# Patient Record
Sex: MALE | Race: WHITE | NOT HISPANIC OR LATINO | Employment: FULL TIME | URBAN - METROPOLITAN AREA
[De-identification: names, ages, dates, MRNs, and addresses within clinical notes are randomized per-mention and may not be internally consistent; named-entity substitution may affect disease eponyms.]

---

## 2024-05-24 ENCOUNTER — LAB (OUTPATIENT)
Dept: LAB | Facility: LAB | Age: 29
End: 2024-05-24
Payer: MEDICAID

## 2024-05-24 ENCOUNTER — OFFICE VISIT (OUTPATIENT)
Dept: PRIMARY CARE | Facility: CLINIC | Age: 29
End: 2024-05-24
Payer: MEDICAID

## 2024-05-24 VITALS
HEIGHT: 72 IN | BODY MASS INDEX: 21.13 KG/M2 | WEIGHT: 156 LBS | DIASTOLIC BLOOD PRESSURE: 66 MMHG | HEART RATE: 66 BPM | TEMPERATURE: 97.1 F | OXYGEN SATURATION: 96 % | SYSTOLIC BLOOD PRESSURE: 118 MMHG

## 2024-05-24 DIAGNOSIS — R11.2 NAUSEA AND VOMITING, UNSPECIFIED VOMITING TYPE: ICD-10-CM

## 2024-05-24 DIAGNOSIS — F20.9 SCHIZOPHRENIA, UNSPECIFIED TYPE (MULTI): ICD-10-CM

## 2024-05-24 DIAGNOSIS — K29.50 CHRONIC GASTRITIS WITHOUT BLEEDING, UNSPECIFIED GASTRITIS TYPE: ICD-10-CM

## 2024-05-24 DIAGNOSIS — Z00.00 WELL ADULT EXAM: Primary | ICD-10-CM

## 2024-05-24 DIAGNOSIS — Z23 ENCOUNTER FOR IMMUNIZATION: ICD-10-CM

## 2024-05-24 DIAGNOSIS — M54.42 CHRONIC LEFT-SIDED LOW BACK PAIN WITH LEFT-SIDED SCIATICA: ICD-10-CM

## 2024-05-24 DIAGNOSIS — G89.29 CHRONIC LEFT-SIDED LOW BACK PAIN WITH LEFT-SIDED SCIATICA: ICD-10-CM

## 2024-05-24 DIAGNOSIS — F41.8 DEPRESSION WITH ANXIETY: ICD-10-CM

## 2024-05-24 DIAGNOSIS — Z00.00 WELL ADULT EXAM: ICD-10-CM

## 2024-05-24 LAB
ALBUMIN SERPL BCP-MCNC: 4.9 G/DL (ref 3.4–5)
ALP SERPL-CCNC: 58 U/L (ref 33–120)
ALT SERPL W P-5'-P-CCNC: 14 U/L (ref 10–52)
ANION GAP SERPL CALC-SCNC: 14 MMOL/L (ref 10–20)
AST SERPL W P-5'-P-CCNC: 15 U/L (ref 9–39)
BILIRUB SERPL-MCNC: 0.4 MG/DL (ref 0–1.2)
BUN SERPL-MCNC: 16 MG/DL (ref 6–23)
CALCIUM SERPL-MCNC: 9.8 MG/DL (ref 8.6–10.6)
CHLORIDE SERPL-SCNC: 101 MMOL/L (ref 98–107)
CHOLEST SERPL-MCNC: 158 MG/DL (ref 0–199)
CHOLESTEROL/HDL RATIO: 2.6
CO2 SERPL-SCNC: 27 MMOL/L (ref 21–32)
CREAT SERPL-MCNC: 1.03 MG/DL (ref 0.5–1.3)
EGFRCR SERPLBLD CKD-EPI 2021: >90 ML/MIN/1.73M*2
ERYTHROCYTE [DISTWIDTH] IN BLOOD BY AUTOMATED COUNT: 12.4 % (ref 11.5–14.5)
GLUCOSE SERPL-MCNC: 96 MG/DL (ref 74–99)
HCT VFR BLD AUTO: 43.6 % (ref 41–52)
HDLC SERPL-MCNC: 59.8 MG/DL
HGB BLD-MCNC: 14.4 G/DL (ref 13.5–17.5)
LDLC SERPL CALC-MCNC: 88 MG/DL
MCH RBC QN AUTO: 29.9 PG (ref 26–34)
MCHC RBC AUTO-ENTMCNC: 33 G/DL (ref 32–36)
MCV RBC AUTO: 91 FL (ref 80–100)
NON HDL CHOLESTEROL: 98 MG/DL (ref 0–149)
NRBC BLD-RTO: 0 /100 WBCS (ref 0–0)
PLATELET # BLD AUTO: 235 X10*3/UL (ref 150–450)
POTASSIUM SERPL-SCNC: 4.6 MMOL/L (ref 3.5–5.3)
PROT SERPL-MCNC: 8 G/DL (ref 6.4–8.2)
RBC # BLD AUTO: 4.82 X10*6/UL (ref 4.5–5.9)
SODIUM SERPL-SCNC: 137 MMOL/L (ref 136–145)
TRIGL SERPL-MCNC: 49 MG/DL (ref 0–149)
TSH SERPL-ACNC: 0.55 MIU/L (ref 0.44–3.98)
VLDL: 10 MG/DL (ref 0–40)
WBC # BLD AUTO: 7.2 X10*3/UL (ref 4.4–11.3)

## 2024-05-24 PROCEDURE — 90471 IMMUNIZATION ADMIN: CPT | Performed by: NURSE PRACTITIONER

## 2024-05-24 PROCEDURE — 85027 COMPLETE CBC AUTOMATED: CPT

## 2024-05-24 PROCEDURE — 1036F TOBACCO NON-USER: CPT | Performed by: NURSE PRACTITIONER

## 2024-05-24 PROCEDURE — 84443 ASSAY THYROID STIM HORMONE: CPT

## 2024-05-24 PROCEDURE — 80061 LIPID PANEL: CPT

## 2024-05-24 PROCEDURE — 36415 COLL VENOUS BLD VENIPUNCTURE: CPT

## 2024-05-24 PROCEDURE — 80053 COMPREHEN METABOLIC PANEL: CPT

## 2024-05-24 PROCEDURE — 90715 TDAP VACCINE 7 YRS/> IM: CPT | Performed by: NURSE PRACTITIONER

## 2024-05-24 PROCEDURE — 99385 PREV VISIT NEW AGE 18-39: CPT | Performed by: NURSE PRACTITIONER

## 2024-05-24 RX ORDER — IBUPROFEN 200 MG
200 TABLET ORAL EVERY 4 HOURS PRN
COMMUNITY

## 2024-05-24 RX ORDER — PALIPERIDONE PALMITATE 234 MG/1.5ML
234 INJECTION INTRAMUSCULAR
COMMUNITY

## 2024-05-24 RX ORDER — OMEPRAZOLE 40 MG/1
40 CAPSULE, DELAYED RELEASE ORAL
Qty: 30 CAPSULE | Refills: 11 | Status: SHIPPED | OUTPATIENT
Start: 2024-05-24 | End: 2025-05-24

## 2024-05-24 RX ORDER — ACETAMINOPHEN 325 MG/1
650 TABLET ORAL EVERY 6 HOURS PRN
COMMUNITY

## 2024-05-24 ASSESSMENT — ENCOUNTER SYMPTOMS
ABDOMINAL DISTENTION: 0
PALPITATIONS: 0
ARTHRALGIAS: 0
DYSURIA: 0
APPETITE CHANGE: 0
NERVOUS/ANXIOUS: 0
EYE ITCHING: 0
NUMBNESS: 0
FREQUENCY: 0
CONSTIPATION: 0
COUGH: 0
NAUSEA: 1
WHEEZING: 0
ABDOMINAL PAIN: 1
DIARRHEA: 0
WOUND: 0
BLOOD IN STOOL: 0
VOMITING: 1
HEMATURIA: 0
SHORTNESS OF BREATH: 0
EYE PAIN: 0
ACTIVITY CHANGE: 0
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
## 2024-05-26 PROBLEM — F41.8 DEPRESSION WITH ANXIETY: Status: ACTIVE | Noted: 2024-05-26

## 2024-05-26 PROBLEM — K29.50 CHRONIC GASTRITIS WITHOUT BLEEDING: Status: ACTIVE | Noted: 2024-05-26

## 2024-05-26 PROBLEM — G89.29 CHRONIC LEFT-SIDED LOW BACK PAIN WITH LEFT-SIDED SCIATICA: Status: ACTIVE | Noted: 2024-05-26

## 2024-05-26 PROBLEM — R11.2 NAUSEA WITH VOMITING: Status: ACTIVE | Noted: 2024-05-26

## 2024-05-26 PROBLEM — F20.9 SCHIZOPHRENIA (MULTI): Status: ACTIVE | Noted: 2024-05-26

## 2024-05-26 PROBLEM — R10.10 RECURRENT UPPER ABDOMINAL PAIN: Status: ACTIVE | Noted: 2024-05-26

## 2024-05-26 PROBLEM — M54.42 CHRONIC LEFT-SIDED LOW BACK PAIN WITH LEFT-SIDED SCIATICA: Status: ACTIVE | Noted: 2024-05-26

## 2024-05-26 NOTE — ASSESSMENT & PLAN NOTE
This is chronic.   Michael is taking prn Pepto/antacids which does provide some relief. In past had episode of hematemesis in 2019.  At that time a CT abd was unremarkable.  EGD completed on 4/2/2019 - normal esophagus, patchy mildly erythmatous gastric antrum mucosa, Biopsied (reactive gastropathy) and hpylori tested (negative).  -start PPI daily -- if symptoms persist will refer to GI in 3 mo   -advised to stop taking ibuprofen (was taking daily for back pain )
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
## 2024-08-02 ENCOUNTER — APPOINTMENT (OUTPATIENT)
Dept: PRIMARY CARE | Facility: CLINIC | Age: 29
End: 2024-08-02
Payer: MEDICAID

## 2024-08-02 VITALS
BODY MASS INDEX: 23.56 KG/M2 | WEIGHT: 173.7 LBS | TEMPERATURE: 97.4 F | OXYGEN SATURATION: 97 % | SYSTOLIC BLOOD PRESSURE: 114 MMHG | DIASTOLIC BLOOD PRESSURE: 77 MMHG | HEART RATE: 90 BPM

## 2024-08-02 DIAGNOSIS — K29.50 CHRONIC GASTRITIS WITHOUT BLEEDING, UNSPECIFIED GASTRITIS TYPE: Primary | ICD-10-CM

## 2024-08-02 PROCEDURE — 1036F TOBACCO NON-USER: CPT | Performed by: NURSE PRACTITIONER

## 2024-08-02 PROCEDURE — 99213 OFFICE O/P EST LOW 20 MIN: CPT | Performed by: NURSE PRACTITIONER

## 2024-08-02 RX ORDER — BUPROPION HYDROCHLORIDE 150 MG/1
150 TABLET ORAL EVERY MORNING
COMMUNITY
Start: 2024-07-24

## 2024-08-02 ASSESSMENT — ENCOUNTER SYMPTOMS
ABDOMINAL DISTENTION: 0
WOUND: 0
ARTHRALGIAS: 0
APPETITE CHANGE: 0
SHORTNESS OF BREATH: 0
WHEEZING: 0
ABDOMINAL PAIN: 0
NUMBNESS: 0
COUGH: 0
DIARRHEA: 0
VOMITING: 0
PALPITATIONS: 0
FATIGUE: 1
NERVOUS/ANXIOUS: 0
CONSTIPATION: 0
ACTIVITY CHANGE: 0

## 2024-08-02 ASSESSMENT — PATIENT HEALTH QUESTIONNAIRE - PHQ9
SUM OF ALL RESPONSES TO PHQ9 QUESTIONS 1 AND 2: 0
2. FEELING DOWN, DEPRESSED OR HOPELESS: NOT AT ALL
SUM OF ALL RESPONSES TO PHQ9 QUESTIONS 1 AND 2: 0
1. LITTLE INTEREST OR PLEASURE IN DOING THINGS: NOT AT ALL
2. FEELING DOWN, DEPRESSED OR HOPELESS: NOT AT ALL
1. LITTLE INTEREST OR PLEASURE IN DOING THINGS: NOT AT ALL

## 2024-08-02 NOTE — ASSESSMENT & PLAN NOTE
This is chronic.   Michael is taking prn Pepto/antacids which does provide some relief. In past had episode of hematemesis in 2019.  At that time a CT abd was unremarkable.  EGD completed on 4/2/2019 - normal esophagus, patchy mildly erythmatous gastric antrum mucosa, Biopsied (reactive gastropathy) and hpylori tested (negative).  -5/24/24 start PPI daily, reassessed 8/2/24 - all symptoms resolved.   -advised to try omeprazole holiday-- if symptoms return can restart.     -if symptoms increase/persist consider GI referral in future .

## 2024-08-02 NOTE — PROGRESS NOTES
Chief Complaint  Follow-up (2 month follow-up, GERD).    History Of Present Illness  Quang Jimenez is a 28 y.o. male presents today for follow up of Follow-up (2 month follow-up, GERD).      Since starting omeprazole stomach discomfort has resolved.  Doing well on omeprazole 40 mg daily.   Has been taking medication daily since 5/24/24.   Review of Systems  Review of Systems   Constitutional:  Positive for fatigue. Negative for activity change and appetite change.   Respiratory:  Negative for cough, shortness of breath and wheezing.    Cardiovascular:  Negative for chest pain, palpitations and leg swelling.   Gastrointestinal:  Negative for abdominal distention, abdominal pain, constipation, diarrhea and vomiting.   Musculoskeletal:  Negative for arthralgias and gait problem.   Skin:  Negative for rash and wound.   Neurological:  Negative for numbness.   Psychiatric/Behavioral:  The patient is not nervous/anxious.        Past Medical History  He has a past medical history of Depression, Nausea, Personal history of nonsuicidal self-harm, Personal history of other specified conditions, and PTSD (post-traumatic stress disorder).    Surgical History  He has a past surgical history that includes Other surgical history (03/27/2019) and Other surgical history (2013).    Family History  Family History   Problem Relation Name Age of Onset    Other (ischemic heart disease) Mother      Other (Eye) Father      Thyroid cancer Sister      Stomach cancer Mother's Brother      Other (GI cancer) Father's Brother      Alcohol abuse Other          Social History  He reports that he quit smoking about 19 months ago. His smoking use included cigarettes. He started smoking about 2 years ago. He has a 1 pack-year smoking history. He has never used smokeless tobacco. He reports that he does not currently use alcohol. He reports current drug use. Drug: Marijuana.    DEPRESSION SCREEN  Over the past 2 weeks, how often have you been bothered  by any of the following problems?  Little interest or pleasure in doing things: Not at all  Feeling down, depressed, or hopeless: Not at all      Allergies  Melatonin and Penicillins    Medications  Current Outpatient Medications   Medication Instructions    acetaminophen (TYLENOL) 650 mg, oral, Every 6 hours PRN    buPROPion XL (WELLBUTRIN XL) 150 mg, oral, Every morning    ibuprofen 200 mg, oral, Every 4 hours PRN    Invega Sustenna 234 mg, intramuscular, Every 28 days    omeprazole (PRILOSEC) 40 mg, oral, Daily before breakfast, Do not crush or chew.        Objective   /77   Pulse 90   Temp 36.3 °C (97.4 °F)   Wt 78.8 kg (173 lb 11.2 oz)   SpO2 97%   BMI 23.56 kg/m²    BMI: Estimated body mass index is 23.56 kg/m² as calculated from the following:    Height as of 5/24/24: 1.829 m (6').    Weight as of this encounter: 78.8 kg (173 lb 11.2 oz).    Physical Exam  Physical Exam  Vitals and nursing note reviewed.   Constitutional:       Appearance: Normal appearance.   HENT:      Head: Normocephalic and atraumatic.      Nose: Nose normal.      Mouth/Throat:      Mouth: Mucous membranes are moist.      Pharynx: Oropharynx is clear.   Eyes:      Extraocular Movements: Extraocular movements intact.      Conjunctiva/sclera: Conjunctivae normal.   Cardiovascular:      Rate and Rhythm: Normal rate and regular rhythm.      Pulses: Normal pulses.      Heart sounds: Normal heart sounds.   Pulmonary:      Effort: Pulmonary effort is normal.      Breath sounds: Normal breath sounds.   Abdominal:      General: Bowel sounds are normal.      Palpations: Abdomen is soft.      Tenderness: There is no abdominal tenderness.   Musculoskeletal:         General: Normal range of motion.      Cervical back: Neck supple.   Skin:     General: Skin is warm and dry.   Neurological:      General: No focal deficit present.      Mental Status: He is alert and oriented to person, place, and time. Mental status is at baseline.    Psychiatric:         Mood and Affect: Mood normal.         Behavior: Behavior normal.         Thought Content: Thought content normal.         Judgment: Judgment normal.         Relevant Results and Imaging  Lab on 05/24/2024   Component Date Value Ref Range Status    Glucose 05/24/2024 96  74 - 99 mg/dL Final    Sodium 05/24/2024 137  136 - 145 mmol/L Final    Potassium 05/24/2024 4.6  3.5 - 5.3 mmol/L Final    Chloride 05/24/2024 101  98 - 107 mmol/L Final    Bicarbonate 05/24/2024 27  21 - 32 mmol/L Final    Anion Gap 05/24/2024 14  10 - 20 mmol/L Final    Urea Nitrogen 05/24/2024 16  6 - 23 mg/dL Final    Creatinine 05/24/2024 1.03  0.50 - 1.30 mg/dL Final    eGFR 05/24/2024 >90  >60 mL/min/1.73m*2 Final    Calculations of estimated GFR are performed using the 2021 CKD-EPI Study Refit equation without the race variable for the IDMS-Traceable creatinine methods.  https://jasn.asnjournals.org/content/early/2021/09/22/ASN.7247769272    Calcium 05/24/2024 9.8  8.6 - 10.6 mg/dL Final    Albumin 05/24/2024 4.9  3.4 - 5.0 g/dL Final    Alkaline Phosphatase 05/24/2024 58  33 - 120 U/L Final    Total Protein 05/24/2024 8.0  6.4 - 8.2 g/dL Final    AST 05/24/2024 15  9 - 39 U/L Final    Bilirubin, Total 05/24/2024 0.4  0.0 - 1.2 mg/dL Final    ALT 05/24/2024 14  10 - 52 U/L Final    Patients treated with Sulfasalazine may generate falsely decreased results for ALT.    Cholesterol 05/24/2024 158  0 - 199 mg/dL Final          Age      Desirable   Borderline High   High     0-19 Y     0 - 169       170 - 199     >/= 200    20-24 Y     0 - 189       190 - 224     >/= 225         >24 Y     0 - 199       200 - 239     >/= 240   **All ranges are based on fasting samples. Specific   therapeutic targets will vary based on patient-specific   cardiac risk.    Pediatric guidelines reference:Pediatrics 2011, 128(S5).Adult guidelines reference: NCEP ATPIII Guidelines,RICHARD 2001, 258:2486-97    Venipuncture immediately after or during  the administration of Metamizole may lead to falsely low results. Testing should be performed immediately prior to Metamizole dosing.    HDL-Cholesterol 05/24/2024 59.8  mg/dL Final      Age       Very Low   Low     Normal    High    0-19 Y    < 35      < 40     40-45     ----  20-24 Y    ----     < 40      >45      ----        >24 Y      ----     < 40     40-60      >60      Cholesterol/HDL Ratio 05/24/2024 2.6   Final      Ref Values  Desirable  < 3.4  High Risk  > 5.0    LDL Calculated 05/24/2024 88  <=99 mg/dL Final                                Near   Borderline      AGE      Desirable  Optimal    High     High     Very High     0-19 Y     0 - 109     ---    110-129   >/= 130     ----    20-24 Y     0 - 119     ---    120-159   >/= 160     ----      >24 Y     0 -  99   100-129  130-159   160-189     >/=190      VLDL 05/24/2024 10  0 - 40 mg/dL Final    Triglycerides 05/24/2024 49  0 - 149 mg/dL Final       Age         Desirable   Borderline High   High     Very High   0 D-90 D    19 - 174         ----         ----        ----  91 D- 9 Y     0 -  74        75 -  99     >/= 100      ----    10-19 Y     0 -  89        90 - 129     >/= 130      ----    20-24 Y     0 - 114       115 - 149     >/= 150      ----         >24 Y     0 - 149       150 - 199    200- 499    >/= 500    Venipuncture immediately after or during the administration of Metamizole may lead to falsely low results. Testing should be performed immediately prior to Metamizole dosing.    Non HDL Cholesterol 05/24/2024 98  0 - 149 mg/dL Final          Age       Desirable   Borderline High   High     Very High     0-19 Y     0 - 119       120 - 144     >/= 145    >/= 160    20-24 Y     0 - 149       150 - 189     >/= 190      ----         >24 Y    30 mg/dL above LDL Cholesterol goal      Thyroid Stimulating Hormone 05/24/2024 0.55  0.44 - 3.98 mIU/L Final    WBC 05/24/2024 7.2  4.4 - 11.3 x10*3/uL Final    nRBC 05/24/2024 0.0  0.0 - 0.0 /100 WBCs Final     RBC 05/24/2024 4.82  4.50 - 5.90 x10*6/uL Final    Hemoglobin 05/24/2024 14.4  13.5 - 17.5 g/dL Final    Hematocrit 05/24/2024 43.6  41.0 - 52.0 % Final    MCV 05/24/2024 91  80 - 100 fL Final    MCH 05/24/2024 29.9  26.0 - 34.0 pg Final    MCHC 05/24/2024 33.0  32.0 - 36.0 g/dL Final    RDW 05/24/2024 12.4  11.5 - 14.5 % Final    Platelets 05/24/2024 235  150 - 450 x10*3/uL Final     No images are attached to the encounter.        Assessment and Plan  Assessment/Plan   Problem List Items Addressed This Visit             ICD-10-CM    Chronic gastritis without bleeding - Primary K29.50     This is chronic.   Michael is taking prn Pepto/antacids which does provide some relief. In past had episode of hematemesis in 2019.  At that time a CT abd was unremarkable.  EGD completed on 4/2/2019 - normal esophagus, patchy mildly erythmatous gastric antrum mucosa, Biopsied (reactive gastropathy) and hpylori tested (negative).  -5/24/24 start PPI daily, reassessed 8/2/24 - all symptoms resolved.   -advised to try omeprazole holiday-- if symptoms return can restart.     -if symptoms increase/persist consider GI referral in future .